# Patient Record
(demographics unavailable — no encounter records)

---

## 2024-11-19 NOTE — PHYSICAL EXAM
[Chaperone Present] : A chaperone was present in the examining room during all aspects of the physical examination [Appropriately responsive] : appropriately responsive [Alert] : alert [No Acute Distress] : no acute distress [No Lymphadenopathy] : no lymphadenopathy [Soft] : soft [Non-tender] : non-tender [Non-distended] : non-distended [No HSM] : No HSM [No Lesions] : no lesions [No Mass] : no mass [Oriented x3] : oriented x3 [Examination Of The Breasts] : a normal appearance [No Masses] : no breast masses were palpable [Labia Majora] : normal [Labia Minora] : normal [Normal] : normal [Uterine Adnexae] : normal [86809] : A chaperone was present during the pelvic exam. [FreeTextEntry2] : Rudi FNP-BC

## 2024-11-19 NOTE — PHYSICAL EXAM
[Chaperone Present] : A chaperone was present in the examining room during all aspects of the physical examination [Appropriately responsive] : appropriately responsive [Alert] : alert [No Acute Distress] : no acute distress [No Lymphadenopathy] : no lymphadenopathy [Soft] : soft [Non-tender] : non-tender [Non-distended] : non-distended [No HSM] : No HSM [No Lesions] : no lesions [No Mass] : no mass [Oriented x3] : oriented x3 [Examination Of The Breasts] : a normal appearance [No Masses] : no breast masses were palpable [Labia Majora] : normal [Labia Minora] : normal [Normal] : normal [Uterine Adnexae] : normal [34568] : A chaperone was present during the pelvic exam. [FreeTextEntry2] : Rudi FNP-BC

## 2024-11-19 NOTE — PLAN
[FreeTextEntry1] : Patient to follow up in 1 year for annual GYN exam   Mammogram and bilateral breast US due:  2/24 Colonoscopy due:  2029 Bone density due:  now Pap ordered   Hemoccult ordered    All questions answered, patient agreeable with plan  I Samaria Font[facility] am scribing for the presence of Dr. Candelario the following sections HISTORY OF PRESENT ILLNESS, PAST MEDICAL/FAMILY/SOCIAL HISTORY; REVIEW OF SYSTEMS; VITAL SIGNS; PHYSICAL EXAM; DISPOSITION. I personally performed the services described in the documentation, reviewed the documentation recorded by the scribe in my presence and it accurately and completely records my words and actions.

## 2024-11-19 NOTE — HISTORY OF PRESENT ILLNESS
[FreeTextEntry1] : Patient is a 58 yo female here today for annual visit. She denies any GYN complaints at this time.

## 2024-11-19 NOTE — HISTORY OF PRESENT ILLNESS
[FreeTextEntry1] : Patient is a 56 yo female here today for annual visit. She denies any GYN complaints at this time.

## 2024-11-19 NOTE — PLAN
[FreeTextEntry1] : Patient to follow up in 1 year for annual GYN exam   Mammogram and bilateral breast US due:  2/24 Colonoscopy due:  2029 Bone density due:  now Pap ordered   Hemoccult ordered    All questions answered, patient agreeable with plan  I Samaria Fontana [facility]-BC am scribing for the presence of Dr. Candelario the following sections HISTORY OF PRESENT ILLNESS, PAST MEDICAL/FAMILY/SOCIAL HISTORY; REVIEW OF SYSTEMS; VITAL SIGNS; PHYSICAL EXAM; DISPOSITION. I personally performed the services described in the documentation, reviewed the documentation recorded by the scribe in my presence and it accurately and completely records my words and actions.